# Patient Record
Sex: MALE | Race: WHITE | NOT HISPANIC OR LATINO | Employment: OTHER | URBAN - METROPOLITAN AREA
[De-identification: names, ages, dates, MRNs, and addresses within clinical notes are randomized per-mention and may not be internally consistent; named-entity substitution may affect disease eponyms.]

---

## 2019-12-21 ENCOUNTER — APPOINTMENT (OUTPATIENT)
Dept: MRI IMAGING | Facility: HOSPITAL | Age: 34
End: 2019-12-21

## 2019-12-21 ENCOUNTER — HOSPITAL ENCOUNTER (EMERGENCY)
Facility: HOSPITAL | Age: 34
Discharge: HOME | End: 2019-12-21
Attending: EMERGENCY MEDICINE | Admitting: EMERGENCY MEDICINE

## 2019-12-21 ENCOUNTER — APPOINTMENT (OUTPATIENT)
Dept: GENERAL RADIOLOGY | Facility: HOSPITAL | Age: 34
End: 2019-12-21

## 2019-12-21 VITALS
WEIGHT: 230 LBS | DIASTOLIC BLOOD PRESSURE: 68 MMHG | HEIGHT: 76 IN | BODY MASS INDEX: 28.01 KG/M2 | OXYGEN SATURATION: 96 % | TEMPERATURE: 98.1 F | SYSTOLIC BLOOD PRESSURE: 120 MMHG | HEART RATE: 50 BPM | RESPIRATION RATE: 18 BRPM

## 2019-12-21 DIAGNOSIS — L56.8 PHOTOSENSITIVITY: ICD-10-CM

## 2019-12-21 DIAGNOSIS — R41.0 CONFUSION: Primary | ICD-10-CM

## 2019-12-21 LAB
ALBUMIN SERPL-MCNC: 4.4 G/DL (ref 3.5–5.2)
ALBUMIN/GLOB SERPL: 1.5 G/DL
ALP SERPL-CCNC: 64 U/L (ref 39–117)
ALT SERPL W P-5'-P-CCNC: 27 U/L (ref 1–41)
ANION GAP SERPL CALCULATED.3IONS-SCNC: 14 MMOL/L (ref 5–15)
AST SERPL-CCNC: 23 U/L (ref 1–40)
BASOPHILS # BLD AUTO: 0.04 10*3/MM3 (ref 0–0.2)
BASOPHILS NFR BLD AUTO: 0.5 % (ref 0–1.5)
BILIRUB SERPL-MCNC: 1.1 MG/DL (ref 0.2–1.2)
BILIRUB UR QL STRIP: NEGATIVE
BUN BLD-MCNC: 8 MG/DL (ref 6–20)
BUN/CREAT SERPL: 12.5 (ref 7–25)
CALCIUM SPEC-SCNC: 9.2 MG/DL (ref 8.6–10.5)
CHLORIDE SERPL-SCNC: 104 MMOL/L (ref 98–107)
CLARITY UR: CLEAR
CO2 SERPL-SCNC: 23 MMOL/L (ref 22–29)
COLOR UR: YELLOW
CREAT BLD-MCNC: 0.64 MG/DL (ref 0.76–1.27)
DEPRECATED RDW RBC AUTO: 42.5 FL (ref 37–54)
EOSINOPHIL # BLD AUTO: 0.2 10*3/MM3 (ref 0–0.4)
EOSINOPHIL NFR BLD AUTO: 2.5 % (ref 0.3–6.2)
ERYTHROCYTE [DISTWIDTH] IN BLOOD BY AUTOMATED COUNT: 12.3 % (ref 12.3–15.4)
ETHANOL BLD-MCNC: <10 MG/DL (ref 0–10)
GFR SERPL CREATININE-BSD FRML MDRD: 142 ML/MIN/1.73
GLOBULIN UR ELPH-MCNC: 3 GM/DL
GLUCOSE BLD-MCNC: 99 MG/DL (ref 65–99)
GLUCOSE UR STRIP-MCNC: NEGATIVE MG/DL
HCT VFR BLD AUTO: 44.2 % (ref 37.5–51)
HGB BLD-MCNC: 14.4 G/DL (ref 13–17.7)
HGB UR QL STRIP.AUTO: NEGATIVE
HOLD SPECIMEN: NORMAL
HOLD SPECIMEN: NORMAL
IMM GRANULOCYTES # BLD AUTO: 0.02 10*3/MM3 (ref 0–0.05)
IMM GRANULOCYTES NFR BLD AUTO: 0.2 % (ref 0–0.5)
KETONES UR QL STRIP: NEGATIVE
LEUKOCYTE ESTERASE UR QL STRIP.AUTO: NEGATIVE
LYMPHOCYTES # BLD AUTO: 3.25 10*3/MM3 (ref 0.7–3.1)
LYMPHOCYTES NFR BLD AUTO: 40.3 % (ref 19.6–45.3)
MCH RBC QN AUTO: 30.4 PG (ref 26.6–33)
MCHC RBC AUTO-ENTMCNC: 32.6 G/DL (ref 31.5–35.7)
MCV RBC AUTO: 93.4 FL (ref 79–97)
MONOCYTES # BLD AUTO: 0.82 10*3/MM3 (ref 0.1–0.9)
MONOCYTES NFR BLD AUTO: 10.2 % (ref 5–12)
NEUTROPHILS # BLD AUTO: 3.73 10*3/MM3 (ref 1.7–7)
NEUTROPHILS NFR BLD AUTO: 46.3 % (ref 42.7–76)
NITRITE UR QL STRIP: NEGATIVE
NRBC BLD AUTO-RTO: 0 /100 WBC (ref 0–0.2)
PH UR STRIP.AUTO: 5.5 [PH] (ref 5–8)
PLATELET # BLD AUTO: 226 10*3/MM3 (ref 140–450)
PMV BLD AUTO: 10.7 FL (ref 6–12)
POTASSIUM BLD-SCNC: 4.2 MMOL/L (ref 3.5–5.2)
PROT SERPL-MCNC: 7.4 G/DL (ref 6–8.5)
PROT UR QL STRIP: NEGATIVE
RBC # BLD AUTO: 4.73 10*6/MM3 (ref 4.14–5.8)
SODIUM BLD-SCNC: 141 MMOL/L (ref 136–145)
SP GR UR STRIP: >=1.03 (ref 1–1.03)
UROBILINOGEN UR QL STRIP: NORMAL
WBC NRBC COR # BLD: 8.06 10*3/MM3 (ref 3.4–10.8)
WHOLE BLOOD HOLD SPECIMEN: NORMAL
WHOLE BLOOD HOLD SPECIMEN: NORMAL

## 2019-12-21 PROCEDURE — 0 GADOBENATE DIMEGLUMINE 529 MG/ML SOLUTION: Performed by: EMERGENCY MEDICINE

## 2019-12-21 PROCEDURE — 71045 X-RAY EXAM CHEST 1 VIEW: CPT

## 2019-12-21 PROCEDURE — 99284 EMERGENCY DEPT VISIT MOD MDM: CPT

## 2019-12-21 PROCEDURE — 81003 URINALYSIS AUTO W/O SCOPE: CPT | Performed by: EMERGENCY MEDICINE

## 2019-12-21 PROCEDURE — 96374 THER/PROPH/DIAG INJ IV PUSH: CPT

## 2019-12-21 PROCEDURE — 93005 ELECTROCARDIOGRAM TRACING: CPT | Performed by: EMERGENCY MEDICINE

## 2019-12-21 PROCEDURE — A9577 INJ MULTIHANCE: HCPCS | Performed by: EMERGENCY MEDICINE

## 2019-12-21 PROCEDURE — 80053 COMPREHEN METABOLIC PANEL: CPT | Performed by: EMERGENCY MEDICINE

## 2019-12-21 PROCEDURE — 80307 DRUG TEST PRSMV CHEM ANLYZR: CPT | Performed by: EMERGENCY MEDICINE

## 2019-12-21 PROCEDURE — 85025 COMPLETE CBC W/AUTO DIFF WBC: CPT | Performed by: EMERGENCY MEDICINE

## 2019-12-21 PROCEDURE — 70553 MRI BRAIN STEM W/O & W/DYE: CPT

## 2019-12-21 RX ORDER — SODIUM CHLORIDE 0.9 % (FLUSH) 0.9 %
10 SYRINGE (ML) INJECTION AS NEEDED
Status: DISCONTINUED | OUTPATIENT
Start: 2019-12-21 | End: 2019-12-21 | Stop reason: HOSPADM

## 2019-12-21 RX ADMIN — GADOBENATE DIMEGLUMINE 20 ML: 529 INJECTION, SOLUTION INTRAVENOUS at 04:08

## 2019-12-21 NOTE — ED PROVIDER NOTES
"David Mckeon is a 34 y.o.male who presents to the emergency department with multiple complaints, including altered mental status. The patient is accompanied by his brother who assists as historian. The patient initially is not cooperative and reluctant to give details.  Following verbal encouragement he is much more cooperative and helpful.  The patient reports increased confusion and memory loss that began today and has worsened since onset. According to his brother, the patient has experienced syncopal episodes with his most recent one taking place around 23:00. During this, his eyes rolled to the back of his head and he was unresponsive for approximately 2 minutes. The patient describes these episodes as severe weakness and difficulty moving. According to his brother, he also has had worsening slurred speech, but he did not observe any seizure like activity. The patient also mentions photophobia, \"twitches\" and intermittent difficulty walking, but he denies difficulty moving his neck. Importantly, he states that he often has symptoms similar to this when he does not get enough sleep, but they usually are not as pronounced and he denies recent interruptions in his sleep schedule.  He denies recent stress. Denies fever, chills, nausea, vomiting, seizure like activity, or other acute complaints.  He is scheduled to see a therapist for his depression, and is referred to follow up with a psychiatrist for his depression and he is not taking any medications for his depression at this time. 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 He smokes cigarettes, but he does not use recreational drugs or drink alcohol. There are no other known acute complaints at this time.        History provided by:  Patient  Altered Mental Status   Presenting symptoms: confusion and memory loss    Severity:  Moderate  Most recent episode:  " "Today  Episode history:  Single  Duration:  1 day  Timing:  Constant  Progression:  Worsening  Chronicity:  Recurrent  Associated symptoms: abnormal movement, depression, slurred speech and weakness    Associated symptoms: no seizures        Review of Systems   Unable to perform ROS: Mental status change   Eyes: Positive for photophobia.   Musculoskeletal: Positive for gait problem. Negative for neck pain and neck stiffness.   Neurological: Positive for tremors (\"twitches\"), syncope, speech difficulty and weakness. Negative for seizures.   Psychiatric/Behavioral: Positive for confusion and memory loss.       No past medical history on file.    Allergies   Allergen Reactions   • Penicillins Rash       No past surgical history on file.    No family history on file.    Social History     Socioeconomic History   • Marital status: Single     Spouse name: Not on file   • Number of children: Not on file   • Years of education: Not on file   • Highest education level: Not on file         Objective   Physical Exam   Constitutional: He is oriented to person, place, and time. He appears well-developed and well-nourished. No distress.   His hat is pulled over his eyes   HENT:   Head: Normocephalic and atraumatic.   Eyes: Pupils are equal, round, and reactive to light. Conjunctivae and EOM are normal. No scleral icterus.   Neck: Normal range of motion. Neck supple.   no nuchal rigidity. Spontaneously moving head in all directions with no pain   Cardiovascular: Normal rate, regular rhythm and normal heart sounds.   Pulmonary/Chest: Effort normal and breath sounds normal. No respiratory distress.   Abdominal: Soft. There is no tenderness.   Musculoskeletal: Normal range of motion.   Neurological: He is alert and oriented to person, place, and time. Coordination abnormal.   generalized weakness bilateral lower extremities, he is able to hold off the bed for 5 seconds but does have significant drift. The weakness is symmetric. Upper " extremity coordination is slightly diminished. Strength is intact.      Skin: Skin is warm and dry.   Psychiatric: He has a normal mood and affect. His behavior is normal.   Nursing note and vitals reviewed.      Procedures         ED Course       Recent Results (from the past 24 hour(s))   Comprehensive Metabolic Panel    Collection Time: 12/21/19  3:25 AM   Result Value Ref Range    Glucose 99 65 - 99 mg/dL    BUN 8 6 - 20 mg/dL    Creatinine 0.64 (L) 0.76 - 1.27 mg/dL    Sodium 141 136 - 145 mmol/L    Potassium 4.2 3.5 - 5.2 mmol/L    Chloride 104 98 - 107 mmol/L    CO2 23.0 22.0 - 29.0 mmol/L    Calcium 9.2 8.6 - 10.5 mg/dL    Total Protein 7.4 6.0 - 8.5 g/dL    Albumin 4.40 3.50 - 5.20 g/dL    ALT (SGPT) 27 1 - 41 U/L    AST (SGOT) 23 1 - 40 U/L    Alkaline Phosphatase 64 39 - 117 U/L    Total Bilirubin 1.1 0.2 - 1.2 mg/dL    eGFR Non African Amer 142 >60 mL/min/1.73    Globulin 3.0 gm/dL    A/G Ratio 1.5 g/dL    BUN/Creatinine Ratio 12.5 7.0 - 25.0    Anion Gap 14.0 5.0 - 15.0 mmol/L   Ethanol    Collection Time: 12/21/19  3:25 AM   Result Value Ref Range    Ethanol <10 0 - 10 mg/dL   Light Blue Top    Collection Time: 12/21/19  3:25 AM   Result Value Ref Range    Extra Tube hold for add-on    Green Top (Gel)    Collection Time: 12/21/19  3:25 AM   Result Value Ref Range    Extra Tube Hold for add-ons.    Lavender Top    Collection Time: 12/21/19  3:25 AM   Result Value Ref Range    Extra Tube hold for add-on    Gold Top - SST    Collection Time: 12/21/19  3:25 AM   Result Value Ref Range    Extra Tube Hold for add-ons.    CBC Auto Differential    Collection Time: 12/21/19  3:25 AM   Result Value Ref Range    WBC 8.06 3.40 - 10.80 10*3/mm3    RBC 4.73 4.14 - 5.80 10*6/mm3    Hemoglobin 14.4 13.0 - 17.7 g/dL    Hematocrit 44.2 37.5 - 51.0 %    MCV 93.4 79.0 - 97.0 fL    MCH 30.4 26.6 - 33.0 pg    MCHC 32.6 31.5 - 35.7 g/dL    RDW 12.3 12.3 - 15.4 %    RDW-SD 42.5 37.0 - 54.0 fl    MPV 10.7 6.0 - 12.0 fL     Platelets 226 140 - 450 10*3/mm3    Neutrophil % 46.3 42.7 - 76.0 %    Lymphocyte % 40.3 19.6 - 45.3 %    Monocyte % 10.2 5.0 - 12.0 %    Eosinophil % 2.5 0.3 - 6.2 %    Basophil % 0.5 0.0 - 1.5 %    Immature Grans % 0.2 0.0 - 0.5 %    Neutrophils, Absolute 3.73 1.70 - 7.00 10*3/mm3    Lymphocytes, Absolute 3.25 (H) 0.70 - 3.10 10*3/mm3    Monocytes, Absolute 0.82 0.10 - 0.90 10*3/mm3    Eosinophils, Absolute 0.20 0.00 - 0.40 10*3/mm3    Basophils, Absolute 0.04 0.00 - 0.20 10*3/mm3    Immature Grans, Absolute 0.02 0.00 - 0.05 10*3/mm3    nRBC 0.0 0.0 - 0.2 /100 WBC   Urinalysis With Microscopic If Indicated (No Culture) - Urine, Clean Catch    Collection Time: 12/21/19  6:10 AM   Result Value Ref Range    Color, UA Yellow Yellow, Straw    Appearance, UA Clear Clear    pH, UA 5.5 5.0 - 8.0    Specific Gravity, UA >=1.030 1.005 - 1.030    Glucose, UA Negative Negative    Ketones, UA Negative Negative    Bilirubin, UA Negative Negative    Blood, UA Negative Negative    Protein, UA Negative Negative    Leuk Esterase, UA Negative Negative    Nitrite, UA Negative Negative    Urobilinogen, UA 0.2 E.U./dL 0.2 - 1.0 E.U./dL     Note: In addition to lab results from this visit, the labs listed above may include labs taken at another facility or during a different encounter within the last 24 hours. Please correlate lab times with ED admission and discharge times for further clarification of the services performed during this visit.    MRI Brain With & Without Contrast   Final Result      XR Chest 1 View   Final Result   No acute cardiopulmonary findings.      Signer Name: Bijan Mike MD    Signed: 12/21/2019 3:25 AM    Workstation Name: [id number]     [facility]        Vitals:    12/21/19 0315 12/21/19 0330 12/21/19 0428 12/21/19 0430   BP: 134/86 116/72 119/73 120/68   Pulse: (!) 48 50     Resp:       Temp:       TempSrc:       SpO2: 99% 96%     Weight:       Height:         Medications    gadobenate dimeglumine (MULTIHANCE) injection 20 mL (20 mL Intravenous Given 12/21/19 0170)     ECG/EMG Results (last 24 hours)     ** No results found for the last 24 hours. **        ECG 12 Lead             Although a medical component has not been completely excluded, there appears to be a significant psychiatric component to pt's presentations.  I have encouraged both medical follow up with neurology and also emphasized the importance of regular psychiatric care, including depression.  Pt denies suicidal or homicidal thoughts, brother confirms.  Pt was observed for an extended period in the ED and has had no recurrent episodes.  He has been resting comfortably, which he attributes to why he is feeling much better at this time.  He has ambulated well in the ED when giving his urine sample.     UDS for unknown reasons was not performed by lab, is not critical to his workup, and was cancelled at the time of discharge.    They are both comfortable with DC at this time and understand to return to the ED if concerns arise.              MDM    Final diagnoses:   Confusion   Photosensitivity       Documentation assistance provided by noam Melendrez.  Information recorded by the scradair was done at my direction and has been verified and validated by me.     Anshul Melendrez  12/21/19 0259       Anshul Melendrez  12/21/19 0414       Bryan Darnell DO  12/21/19 2932

## 2019-12-21 NOTE — DISCHARGE INSTRUCTIONS
Follow up with one of the [facility] Primary Care Providers below to setup primary care. If you need assistance coordinating a primary care appointment with a [facility] Primary Care Provider, please contact the Primary Care Coordinators at [phone] for appointment scheduling.    [facility], Primary Care   [address] , [address]   [place], [place] [address] [phone]    [facility] Internal Medicine & Endocrinology  [address], [address]  [place] [address] [phone]    [facility] Family Medicine  [address], [address]   [place] [address] [phone]    [facility] Primary Care  [address], [address]  [place], [place] [id number] [phone]    [facility], Primary Care,   [address], [address]   [place], [place] [address] [phone]    [facility] Primary Care  [address]  [place], [place] [id number] [phone]    [facility], Primary Care  [address], [address]  [place], [place] [address] [phone]    [facility] Internal Medicine & Pediatrics  [address], [address]   [place] [address] [phone]    [facility], Primary Care  [address] [place] [address] [phone]      [facility] Primary Care  [address], [address]   [place] [address] [phone]    [facility] Family Medicine  [address] [place] [address] [phone]

## 2022-05-27 ENCOUNTER — HOSPITAL ENCOUNTER (EMERGENCY)
Facility: HOSPITAL | Age: 37
Discharge: ANOTHER HOSPITAL | End: 2022-05-27
Attending: EMERGENCY MEDICINE | Admitting: EMERGENCY MEDICINE

## 2022-05-27 ENCOUNTER — APPOINTMENT (OUTPATIENT)
Dept: CT IMAGING | Facility: HOSPITAL | Age: 37
End: 2022-05-27

## 2022-05-27 VITALS
SYSTOLIC BLOOD PRESSURE: 128 MMHG | HEIGHT: 76 IN | DIASTOLIC BLOOD PRESSURE: 77 MMHG | BODY MASS INDEX: 30.44 KG/M2 | RESPIRATION RATE: 18 BRPM | OXYGEN SATURATION: 92 % | HEART RATE: 47 BPM | WEIGHT: 250 LBS | TEMPERATURE: 98.7 F

## 2022-05-27 DIAGNOSIS — F21 SCHIZOTYPAL DISORDER: Primary | ICD-10-CM

## 2022-05-27 DIAGNOSIS — F23 PSYCHOSIS DUE TO EMOTIONAL STRESS: ICD-10-CM

## 2022-05-27 LAB
ALBUMIN SERPL-MCNC: 4.4 G/DL (ref 3.5–5.2)
ALBUMIN/GLOB SERPL: 1.5 G/DL
ALP SERPL-CCNC: 79 U/L (ref 39–117)
ALT SERPL W P-5'-P-CCNC: 50 U/L (ref 1–41)
AMPHET+METHAMPHET UR QL: NEGATIVE
AMPHETAMINES UR QL: NEGATIVE
ANION GAP SERPL CALCULATED.3IONS-SCNC: 11.3 MMOL/L (ref 5–15)
APAP SERPL-MCNC: <5 MCG/ML (ref 0–30)
AST SERPL-CCNC: 38 U/L (ref 1–40)
BARBITURATES UR QL SCN: NEGATIVE
BASOPHILS # BLD AUTO: 0.04 10*3/MM3 (ref 0–0.2)
BASOPHILS NFR BLD AUTO: 0.5 % (ref 0–1.5)
BENZODIAZ UR QL SCN: NEGATIVE
BILIRUB SERPL-MCNC: 0.8 MG/DL (ref 0–1.2)
BILIRUB UR QL STRIP: NEGATIVE
BUN SERPL-MCNC: 7 MG/DL (ref 6–20)
BUN/CREAT SERPL: 10.8 (ref 7–25)
BUPRENORPHINE SERPL-MCNC: NEGATIVE NG/ML
CALCIUM SPEC-SCNC: 9.1 MG/DL (ref 8.6–10.5)
CANNABINOIDS SERPL QL: NEGATIVE
CHLORIDE SERPL-SCNC: 103 MMOL/L (ref 98–107)
CLARITY UR: CLEAR
CO2 SERPL-SCNC: 21.7 MMOL/L (ref 22–29)
COCAINE UR QL: NEGATIVE
COLOR UR: YELLOW
CREAT SERPL-MCNC: 0.65 MG/DL (ref 0.76–1.27)
D-LACTATE SERPL-SCNC: 0.8 MMOL/L (ref 0.5–2)
DEPRECATED RDW RBC AUTO: 41.1 FL (ref 37–54)
EGFRCR SERPLBLD CKD-EPI 2021: 123.7 ML/MIN/1.73
EOSINOPHIL # BLD AUTO: 0.18 10*3/MM3 (ref 0–0.4)
EOSINOPHIL NFR BLD AUTO: 2.1 % (ref 0.3–6.2)
ERYTHROCYTE [DISTWIDTH] IN BLOOD BY AUTOMATED COUNT: 12.4 % (ref 12.3–15.4)
ETHANOL BLD-MCNC: <10 MG/DL (ref 0–10)
ETHANOL UR QL: <0.01 %
FLUAV RNA RESP QL NAA+PROBE: NOT DETECTED
FLUBV RNA RESP QL NAA+PROBE: NOT DETECTED
GLOBULIN UR ELPH-MCNC: 2.9 GM/DL
GLUCOSE SERPL-MCNC: 91 MG/DL (ref 65–99)
GLUCOSE UR STRIP-MCNC: NEGATIVE MG/DL
HCT VFR BLD AUTO: 42.4 % (ref 37.5–51)
HGB BLD-MCNC: 14.6 G/DL (ref 13–17.7)
HGB UR QL STRIP.AUTO: NEGATIVE
IMM GRANULOCYTES # BLD AUTO: 0.02 10*3/MM3 (ref 0–0.05)
IMM GRANULOCYTES NFR BLD AUTO: 0.2 % (ref 0–0.5)
KETONES UR QL STRIP: ABNORMAL
LEUKOCYTE ESTERASE UR QL STRIP.AUTO: NEGATIVE
LYMPHOCYTES # BLD AUTO: 3.34 10*3/MM3 (ref 0.7–3.1)
LYMPHOCYTES NFR BLD AUTO: 38.6 % (ref 19.6–45.3)
MCH RBC QN AUTO: 31.1 PG (ref 26.6–33)
MCHC RBC AUTO-ENTMCNC: 34.4 G/DL (ref 31.5–35.7)
MCV RBC AUTO: 90.2 FL (ref 79–97)
METHADONE UR QL SCN: NEGATIVE
MONOCYTES # BLD AUTO: 0.66 10*3/MM3 (ref 0.1–0.9)
MONOCYTES NFR BLD AUTO: 7.6 % (ref 5–12)
NEUTROPHILS NFR BLD AUTO: 4.42 10*3/MM3 (ref 1.7–7)
NEUTROPHILS NFR BLD AUTO: 51 % (ref 42.7–76)
NITRITE UR QL STRIP: NEGATIVE
NRBC BLD AUTO-RTO: 0 /100 WBC (ref 0–0.2)
OPIATES UR QL: NEGATIVE
OXYCODONE UR QL SCN: NEGATIVE
PCP UR QL SCN: NEGATIVE
PH UR STRIP.AUTO: <=5 [PH] (ref 5–8)
PLATELET # BLD AUTO: 178 10*3/MM3 (ref 140–450)
PMV BLD AUTO: 10.3 FL (ref 6–12)
POTASSIUM SERPL-SCNC: 4 MMOL/L (ref 3.5–5.2)
PROPOXYPH UR QL: NEGATIVE
PROT SERPL-MCNC: 7.3 G/DL (ref 6–8.5)
PROT UR QL STRIP: NEGATIVE
RBC # BLD AUTO: 4.7 10*6/MM3 (ref 4.14–5.8)
SALICYLATES SERPL-MCNC: <0.3 MG/DL
SARS-COV-2 RNA RESP QL NAA+PROBE: NOT DETECTED
SODIUM SERPL-SCNC: 136 MMOL/L (ref 136–145)
SP GR UR STRIP: 1.02 (ref 1–1.03)
TRICYCLICS UR QL SCN: NEGATIVE
UROBILINOGEN UR QL STRIP: ABNORMAL
WBC NRBC COR # BLD: 8.66 10*3/MM3 (ref 3.4–10.8)

## 2022-05-27 PROCEDURE — 83605 ASSAY OF LACTIC ACID: CPT | Performed by: PHYSICIAN ASSISTANT

## 2022-05-27 PROCEDURE — 81003 URINALYSIS AUTO W/O SCOPE: CPT | Performed by: PHYSICIAN ASSISTANT

## 2022-05-27 PROCEDURE — 99284 EMERGENCY DEPT VISIT MOD MDM: CPT

## 2022-05-27 PROCEDURE — 70450 CT HEAD/BRAIN W/O DYE: CPT

## 2022-05-27 PROCEDURE — 93005 ELECTROCARDIOGRAM TRACING: CPT | Performed by: PHYSICIAN ASSISTANT

## 2022-05-27 PROCEDURE — 80143 DRUG ASSAY ACETAMINOPHEN: CPT | Performed by: PHYSICIAN ASSISTANT

## 2022-05-27 PROCEDURE — 80306 DRUG TEST PRSMV INSTRMNT: CPT | Performed by: PHYSICIAN ASSISTANT

## 2022-05-27 PROCEDURE — 80179 DRUG ASSAY SALICYLATE: CPT | Performed by: PHYSICIAN ASSISTANT

## 2022-05-27 PROCEDURE — 82077 ASSAY SPEC XCP UR&BREATH IA: CPT | Performed by: PHYSICIAN ASSISTANT

## 2022-05-27 PROCEDURE — 36415 COLL VENOUS BLD VENIPUNCTURE: CPT

## 2022-05-27 PROCEDURE — 80053 COMPREHEN METABOLIC PANEL: CPT | Performed by: PHYSICIAN ASSISTANT

## 2022-05-27 PROCEDURE — 85025 COMPLETE CBC W/AUTO DIFF WBC: CPT | Performed by: PHYSICIAN ASSISTANT

## 2022-05-27 PROCEDURE — 87636 SARSCOV2 & INF A&B AMP PRB: CPT | Performed by: PHYSICIAN ASSISTANT

## 2022-05-27 RX ADMIN — NICOTINE 1 PATCH: 7 PATCH TRANSDERMAL at 17:01
